# Patient Record
Sex: MALE | Race: WHITE | NOT HISPANIC OR LATINO | URBAN - METROPOLITAN AREA
[De-identification: names, ages, dates, MRNs, and addresses within clinical notes are randomized per-mention and may not be internally consistent; named-entity substitution may affect disease eponyms.]

---

## 2019-08-03 ENCOUNTER — EMERGENCY (EMERGENCY)
Facility: HOSPITAL | Age: 39
LOS: 1 days | Discharge: HOME | End: 2019-08-03
Attending: EMERGENCY MEDICINE | Admitting: EMERGENCY MEDICINE
Payer: SELF-PAY

## 2019-08-03 VITALS
HEART RATE: 78 BPM | RESPIRATION RATE: 18 BRPM | SYSTOLIC BLOOD PRESSURE: 138 MMHG | DIASTOLIC BLOOD PRESSURE: 84 MMHG | WEIGHT: 210.1 LBS | OXYGEN SATURATION: 98 % | HEIGHT: 74 IN | TEMPERATURE: 98 F

## 2019-08-03 LAB
ALBUMIN SERPL ELPH-MCNC: 4.7 G/DL — SIGNIFICANT CHANGE UP (ref 3.3–5)
ALP SERPL-CCNC: 74 U/L — SIGNIFICANT CHANGE UP (ref 40–120)
ALT FLD-CCNC: 19 U/L — SIGNIFICANT CHANGE UP (ref 10–45)
ANION GAP SERPL CALC-SCNC: 14 MMOL/L — SIGNIFICANT CHANGE UP (ref 5–17)
AST SERPL-CCNC: 21 U/L — SIGNIFICANT CHANGE UP (ref 10–40)
BASOPHILS # BLD AUTO: 0.05 K/UL — SIGNIFICANT CHANGE UP (ref 0–0.2)
BASOPHILS NFR BLD AUTO: 0.6 % — SIGNIFICANT CHANGE UP (ref 0–2)
BILIRUB SERPL-MCNC: 1.3 MG/DL — HIGH (ref 0.2–1.2)
BUN SERPL-MCNC: 12 MG/DL — SIGNIFICANT CHANGE UP (ref 7–23)
CALCIUM SERPL-MCNC: 9.4 MG/DL — SIGNIFICANT CHANGE UP (ref 8.4–10.5)
CHLORIDE SERPL-SCNC: 101 MMOL/L — SIGNIFICANT CHANGE UP (ref 96–108)
CO2 SERPL-SCNC: 25 MMOL/L — SIGNIFICANT CHANGE UP (ref 22–31)
CREAT SERPL-MCNC: 0.98 MG/DL — SIGNIFICANT CHANGE UP (ref 0.5–1.3)
EOSINOPHIL # BLD AUTO: 0.17 K/UL — SIGNIFICANT CHANGE UP (ref 0–0.5)
EOSINOPHIL NFR BLD AUTO: 1.9 % — SIGNIFICANT CHANGE UP (ref 0–6)
GLUCOSE SERPL-MCNC: 103 MG/DL — HIGH (ref 70–99)
HCT VFR BLD CALC: 44.4 % — SIGNIFICANT CHANGE UP (ref 39–50)
HGB BLD-MCNC: 15.2 G/DL — SIGNIFICANT CHANGE UP (ref 13–17)
IMM GRANULOCYTES NFR BLD AUTO: 0.3 % — SIGNIFICANT CHANGE UP (ref 0–1.5)
LYMPHOCYTES # BLD AUTO: 1.38 K/UL — SIGNIFICANT CHANGE UP (ref 1–3.3)
LYMPHOCYTES # BLD AUTO: 15.6 % — SIGNIFICANT CHANGE UP (ref 13–44)
MCHC RBC-ENTMCNC: 32 PG — SIGNIFICANT CHANGE UP (ref 27–34)
MCHC RBC-ENTMCNC: 34.2 GM/DL — SIGNIFICANT CHANGE UP (ref 32–36)
MCV RBC AUTO: 93.5 FL — SIGNIFICANT CHANGE UP (ref 80–100)
MONOCYTES # BLD AUTO: 0.87 K/UL — SIGNIFICANT CHANGE UP (ref 0–0.9)
MONOCYTES NFR BLD AUTO: 9.8 % — SIGNIFICANT CHANGE UP (ref 2–14)
NEUTROPHILS # BLD AUTO: 6.35 K/UL — SIGNIFICANT CHANGE UP (ref 1.8–7.4)
NEUTROPHILS NFR BLD AUTO: 71.8 % — SIGNIFICANT CHANGE UP (ref 43–77)
NRBC # BLD: 0 /100 WBCS — SIGNIFICANT CHANGE UP (ref 0–0)
PLATELET # BLD AUTO: 282 K/UL — SIGNIFICANT CHANGE UP (ref 150–400)
POTASSIUM SERPL-MCNC: 4.3 MMOL/L — SIGNIFICANT CHANGE UP (ref 3.5–5.3)
POTASSIUM SERPL-SCNC: 4.3 MMOL/L — SIGNIFICANT CHANGE UP (ref 3.5–5.3)
PROT SERPL-MCNC: 8.2 G/DL — SIGNIFICANT CHANGE UP (ref 6–8.3)
RBC # BLD: 4.75 M/UL — SIGNIFICANT CHANGE UP (ref 4.2–5.8)
RBC # FLD: 11.4 % — SIGNIFICANT CHANGE UP (ref 10.3–14.5)
SODIUM SERPL-SCNC: 140 MMOL/L — SIGNIFICANT CHANGE UP (ref 135–145)
WBC # BLD: 8.85 K/UL — SIGNIFICANT CHANGE UP (ref 3.8–10.5)
WBC # FLD AUTO: 8.85 K/UL — SIGNIFICANT CHANGE UP (ref 3.8–10.5)

## 2019-08-03 PROCEDURE — 85025 COMPLETE CBC W/AUTO DIFF WBC: CPT

## 2019-08-03 PROCEDURE — 80053 COMPREHEN METABOLIC PANEL: CPT

## 2019-08-03 PROCEDURE — 99283 EMERGENCY DEPT VISIT LOW MDM: CPT

## 2019-08-03 PROCEDURE — 36415 COLL VENOUS BLD VENIPUNCTURE: CPT

## 2019-08-03 PROCEDURE — 99284 EMERGENCY DEPT VISIT MOD MDM: CPT

## 2019-08-03 RX ORDER — ACETAMINOPHEN 500 MG
650 TABLET ORAL ONCE
Refills: 0 | Status: COMPLETED | OUTPATIENT
Start: 2019-08-03 | End: 2019-08-03

## 2019-08-03 RX ADMIN — Medication 650 MILLIGRAM(S): at 18:28

## 2019-08-03 RX ADMIN — Medication 650 MILLIGRAM(S): at 17:40

## 2019-08-03 NOTE — ED PROVIDER NOTE - CLINICAL SUMMARY MEDICAL DECISION MAKING FREE TEXT BOX
Patient with a  recurrent perianal abscess which burst while in ED. No further drainage or decrease pain. Surgery was consulted due to history. As per surgical consult recommended sitz bath and f/u with colorectal surgeon prior to departure from [place]. As per patient would refer to follow up back home. Abx therapy was discussed with ED attending and was rx prescribed due open fistula and  h/o complexities in the past. Being that pt has already had purulent drainage and still remains for 5 more days in [place] without f/u due to preference. It would be in the best interest of the patient. Patient was very agreeable and content with having the abx.

## 2019-08-03 NOTE — ED ADULT NURSE NOTE - OBJECTIVE STATEMENT
AOX4 +ambulatory patient reports rectal abscess on his rectal area x 2 days. Patient states he went to the bathroom in the ED and felt a pop and noted pus on the toilet paper. Denies any fevers or chills.

## 2019-08-03 NOTE — ED ADULT NURSE NOTE - CHIEF COMPLAINT QUOTE
Patient c/o of pain in rectal area, [facility] was diagnosed w/ pratibha-anal abscess in urgent care and told to go to ED.  [facility] has had previous Hx. of pratibha-anal abscess requiring surgery.  Travelling from [place], returning in 5 days.   Denies any blood on stool.

## 2019-08-03 NOTE — CONSULT NOTE ADULT - SUBJECTIVE AND OBJECTIVE BOX
This is a 40 y/o M with no significant PMH visiting [facility] from [place], who has a PSH significant for multiple pratibha-anal and pratibha-rectal abscesses since he was 14 y/o.   He had around 7 I and D and fistula treatment, last one was 7 years ago.   He was worked up for Crohn's and was negative, he had a colonscopy 10 years ago that he report was normal.   He started feeling pratibha anal discomfort a few days ago but really started to have pain today.   He went to an urgent care who advised him to go to the ER to be evaluated for pratibha anal abscess.   However while at the urgent care, he went to the bathroom and the abscess spontaneously drained resulting in immediate relief.   He is currently asymptomatic     PMH: none   PSH: multiple I and D and fistula procedure, no abdominal surgery   FH: no family history of Crohn's or UC   SH: quit smoking 15 years ago     REVIEW OF SYSTEMS:   Neuro: + headache   Eye: no vision disturbances  ENT: no dysphagia   Pulm: no shortness of breath  Cardio-vascular: no chest pain   GI: no nausea / vomitting   : no dysuria   ID: no fever   Endocrine: no polyuria / polydipsia   Heme: no bleeding tendency      PHYSICAL EXAM:  Neuro: awake and alert, no focal deficit   HEENT: normocephalic, no scleral ictera   Pulm: non labored breathing on room air, lungs are clear to auscultation   CV: regular rate and rhythm, no murmur to ausculation, no carotid bruit   GI: abdomen is soft non tender to palaption, no hepatomegaly. Old abscess cavity soft, skin non indurated, no drainage, no surrounding erythema, no tenderness to palpation, rectal exam without obvious fistula     : normal male genitalia, no testicular swelling   MSK: no swelling, no deformity  Skin: no rash, no wound   Psych: cooperative, appropriate mood and affect This is a 40 y/o M with no significant PMH visiting [facility] from [place], who has a PSH significant for multiple pratibha-anal and pratibha-rectal abscesses since he was 14 y/o.   He had around 7 I and D and fistula treatment, last one was 7 years ago.                         15.2   8.85  )-----------( 282      ( 03 Aug 2019 17:37 )             44.4   He was worked up for Crohn's and was negative, he had a colonscopy 10 years ago that he report was normal.   He started feeling pratibha anal discomfort a few days ago but really started to have pain today.   He went to an urgent care who advised him to go to the ER to be evaluated for pratibha anal abscess.   However while at the urgent care, he went to the bathroom and the abscess spontaneously drained resulting in immediate relief.   He is currently asymptomatic     PMH: none   PSH: multiple I and D and fistula procedure, no abdominal surgery   FH: no family history of Crohn's or UC   SH: quit smoking 15 years ago     REVIEW OF SYSTEMS:   Neuro: + headache   Eye: no vision disturbances  ENT: no dysphagia   Pulm: no shortness of breath  Cardio-vascular: no chest pain   GI: no nausea / vomitting   : no dysuria   ID: no fever   Endocrine: no polyuria / polydipsia   Heme: no bleeding tendency      PHYSICAL EXAM:  Neuro: awake and alert, no focal deficit   HEENT: normocephalic, no scleral ictera   Pulm: non labored breathing on room air, lungs are clear to auscultation   CV: regular rate and rhythm, no murmur to ausculation, no carotid bruit   GI: abdomen is soft non tender to palaption, no hepatomegaly. Old abscess cavity soft, skin non indurated, no drainage, no surrounding erythema, no tenderness to palpation, rectal exam without obvious fistula     : normal male genitalia, no testicular swelling   MSK: no swelling, no deformity  Skin: no rash, no wound   Psych: cooperative, appropriate mood and affect     LABS:   CBC Full  -  ( 03 Aug 2019 17:37 )  WBC Count : 8.85 K/uL  RBC Count : 4.75 M/uL  Hemoglobin : 15.2 g/dL  Hematocrit : 44.4 %  Platelet Count - Automated : 282 K/uL  Mean Cell Volume : 93.5 fl  Mean Cell Hemoglobin : 32.0 pg  Mean Cell Hemoglobin Concentration : 34.2 gm/dL  Auto Neutrophil # : 6.35 K/uL  Auto Lymphocyte # : 1.38 K/uL  Auto Monocyte # : 0.87 K/uL  Auto Eosinophil # : 0.17 K/uL  Auto Basophil # : 0.05 K/uL  Auto Neutrophil % : 71.8 %  Auto Lymphocyte % : 15.6 %  Auto Monocyte % : 9.8 %  Auto Eosinophil % : 1.9 %  Auto Basophil % : 0.6 % This is a 40 y/o M with no significant PMH visiting [facility] from [place], who has a PSH significant for multiple pratibha-anal and pratibha-rectal abscesses since he was 14 y/o.   He had around 7 I and D and fistula treatment, last one was 7 years ago.   He was worked up for Crohn's and was negative, he had a colonscopy 10 years ago that he report was normal.   He started feeling pratibha anal discomfort a few days ago but really started to have pain today.   He went to an urgent care who advised him to go to the ER to be evaluated for pratibha anal abscess.   However while at the urgent care, he went to the bathroom and the abscess spontaneously drained resulting in immediate relief.   He is currently asymptomatic     PMH: none   PSH: multiple I and D and fistula procedure, no abdominal surgery   FH: no family history of Crohn's or UC   SH: quit smoking 15 years ago     REVIEW OF SYSTEMS:   Neuro: + headache   Eye: no vision disturbances  ENT: no dysphagia   Pulm: no shortness of breath  Cardio-vascular: no chest pain   GI: no nausea / vomitting   : no dysuria   ID: no fever   Endocrine: no polyuria / polydipsia   Heme: no bleeding tendency      PHYSICAL EXAM:  Neuro: awake and alert, no focal deficit   HEENT: normocephalic, no scleral ictera   Pulm: non labored breathing on room air, lungs are clear to auscultation   CV: regular rate and rhythm, no murmur to ausculation, no carotid bruit   GI: abdomen is soft non tender to palaption, no hepatomegaly. Old abscess cavity soft, skin non indurated, no drainage, no surrounding erythema, no tenderness to palpation, rectal exam without obvious fistula     : normal male genitalia, no testicular swelling   MSK: no swelling, no deformity  Skin: no rash, no wound   Psych: cooperative, appropriate mood and affect     LABS:   CBC Full  -  ( 03 Aug 2019 17:37 )  WBC Count : 8.85 K/uL  RBC Count : 4.75 M/uL  Hemoglobin : 15.2 g/dL  Hematocrit : 44.4 %  Platelet Count - Automated : 282 K/uL  Mean Cell Volume : 93.5 fl  Mean Cell Hemoglobin : 32.0 pg  Mean Cell Hemoglobin Concentration : 34.2 gm/dL  Auto Neutrophil # : 6.35 K/uL  Auto Lymphocyte # : 1.38 K/uL  Auto Monocyte # : 0.87 K/uL  Auto Eosinophil # : 0.17 K/uL  Auto Basophil # : 0.05 K/uL  Auto Neutrophil % : 71.8 %  Auto Lymphocyte % : 15.6 %  Auto Monocyte % : 9.8 %  Auto Eosinophil % : 1.9 %  Auto Basophil % : 0.6 %

## 2019-08-03 NOTE — ED PROVIDER NOTE - OBJECTIVE STATEMENT
38 y/o Male with a PMHx of 6-7 perianal abscesses, last one was approximately 7 years ago presents to the ED c/o an perianal abscess x2 days. Pt comes into the ED for possible perianal abscess, he states the swelling and pain feels the same as previous episodes. The pain and swelling progressively worsened today. While in the ER, he used to the restroom and felt a rupture and noticed pus-like drainage and blood in the toilet bowl, the swelling and pressure has decreased. Pt is also c/o a headache but denies fever, chills, and N/V/D. Pt is visiting from [place] and is returning in 5 days. 38 y/o Male with a PMHx of 6-7 perianal abscesses, last one was approximately 7 years ago. Patient  presents to the ED c/o an perianal abscess x2 days. He states there was swelling and pain similar to previous episodes. The pain and swelling progressively worsened today. While in the ER, he used to the restroom felt a rupture and noticed pus-like drainage mixed  with ,blood in the toilet bowl. The swelling and pressure has decreased. Pt is also c/o a headache but denies fever, chills, and N/V/D. Pt is visiting from [place] and is returning in 5 days.

## 2019-08-03 NOTE — ED PROVIDER NOTE - SKIN, MLM
Fistula visualized to the left buttock, proximal to the anus. Mild induration, but no cellulitis or surrounding erythema noted. No tenderness to palpation. No purulent drainage or any drainage appreciated.

## 2019-08-03 NOTE — CONSULT NOTE ADULT - ASSESSMENT
This is a 40 y/o M with PSH significant for multiple pratibha-anal / rectal abscesses   No personal or family history of IBD   Colonoscopy normal 10 years ago   Sent to the ED by Urgent Care for pratibha anal abscess that spontaneously drained     Currently patient is asymptomatic, no tenderness on rectal exam   Normal WBC   Patient has planned to fly back to [place] in 5 days     Plan:   - No need for further imaging   - No need for antibiotics   - Recommend sitz bath   - Recommend following up with a colorectal surgeon ( Dr Mckeon, Dr Almeida or Dr Mcnamara) in [facility] prior to flying to [place]

## 2019-08-03 NOTE — ED PROVIDER NOTE - NSFOLLOWUPINSTRUCTIONS_ED_ALL_ED_FT
Recommend warm compress / sitz bath. Follow up with colorectal surgeon. Return to ED if condition worsen.

## 2019-08-03 NOTE — ED ADULT NURSE NOTE - NSIMPLEMENTINTERV_GEN_ALL_ED
" I tripped in the living room and my shoes caught in the rug and I fell." Hit head on the floor and with right ankle swelling, on [employer] Implemented All Universal Safety Interventions:  [place] to call system. Call bell, personal items and telephone within reach. Instruct patient to call for assistance. Room bathroom lighting operational. Non-slip footwear when patient is off stretcher. Physically safe environment: no spills, clutter or unnecessary equipment. Stretcher in lowest position, wheels locked, appropriate side rails in place.

## 2019-08-03 NOTE — ED PROVIDER NOTE - ATTENDING CONTRIBUTION TO CARE
Pt w/ PMHx multiple pratibha-anal and pratibha-rectal abscesses since he was 14 y/o. Last I&D appro 7 years ago.   He was worked up for Crohn's and was negative, he had a colonoscopy 10 years ago that he report was normal. Pt visiting from [place]. Pt started feeling pratibha anal discomfort a few days ago but really started to have pain today. Pt went to  and was referred to the ED for further eval. Prior to exam, pt went to the bathroom and the abscess spontaneously drained, resulting in immediate relief. Pt is feeling much better. No f/c.   Constitutional: Well appearing, well nourished, awake, alert, oriented to person, place, time/situation and in no apparent distress.  Rectal performed by SALEEM Hickman, reviewed  Musculoskeletal: Range of motion is not limited  Neuro: Alert and oriented x 3, face symmetric and speech fluent. Strength 5/5 x 4 ext and symmetric, nml gross motor movement, nml gait. No focal deficits noted.  Skin: Skin normal color for race, warm, dry and intact. No evidence of rash.  Psych: Alert and oriented to person, place, time/situation. normal mood and affect. no apparent risk to self or others.  Pt w/ hx perianal / perirectal abscess, seemingly spontaneously drained prior to arrival. Initial plan for CT and surgical consult. Pt evaluated by surgery, felt to have spontaneously drained. No pain on rectal exam no pain. Surgery recommends no imaging, and pt happy w/ plan. Sitz baths, follow up colorectal prior to leaving to  in 5 days. Pt reports preference fo abx given hx in the past, and unlikely to see colorectal prior to leaving back to . Rx fo abx. Return precautions given.

## 2019-08-03 NOTE — ED ADULT TRIAGE NOTE - CHIEF COMPLAINT QUOTE
Patient c/o of pain in rectal area, [facility] was diagnosed w/ pratibha-anal abscess in urgent care and told to go to ED.  [facility] has had previous Hx. of pratibha-anal abscess requiring surgery.  Travelling from [place], returning in 5 days.   Denies any blood on stool.

## 2019-08-11 DIAGNOSIS — K61.0 ANAL ABSCESS: ICD-10-CM

## 2019-08-11 DIAGNOSIS — K62.89 OTHER SPECIFIED DISEASES OF ANUS AND RECTUM: ICD-10-CM

## 2022-01-22 NOTE — ED PROVIDER NOTE - NS ED ATTENDING STATEMENT MOD
body pain I have personally performed a face to face diagnostic evaluation on this patient. I have reviewed the ACP note and agree with the history, exam and plan of care, except as noted.